# Patient Record
(demographics unavailable — no encounter records)

---

## 2019-10-22 NOTE — ECGEPIP
[facility] [place]

                                       

                                       Test Date:    2019-10-21

Pat Name:     ARA DANIELLE           Department:   

Patient ID:   [id number]                 Room:         -

Gender:       Female                   Technician:   daysi

:          2016               Requested By: DALIA BARBA PA-C

Order Number: [id number]     Reading MD:   Vasquez Cross

                                 Measurements

Intervals                              Axis          

Rate:         90                       P:            43

OH:           111                      QRS:          63

QRSD:         81                       T:            30

QT:           348                                    

QTc:          428                                    

                           Interpretive Statements

..PEDIATRIC ECG INTERPRETATION

NORMAL SINUS ARRHYTHMIA

Electronically Signed on 10- 11:40:03 EDT by Vasquez Cross

## 2019-11-25 NOTE — REP
Clinical:  Cough .

Technique:  PA and lateral.

 

Comparison:  None .

 

Findings:

The mediastinum and cardiothymic silhouette are normal.  Increased perihilar

markings suggest viral pneumonia and bronchiolitis without focal consolidation.

No effusion, or pneumothorax.  Skeletal structures are intact and normal for

age.

 

Impression:

Bronchiolitis suggested.

No focal consolidation.

 

 

Electronically Signed by

Brown Riggins MD 11/25/2019 01:51 P